# Patient Record
Sex: MALE | NOT HISPANIC OR LATINO | URBAN - METROPOLITAN AREA
[De-identification: names, ages, dates, MRNs, and addresses within clinical notes are randomized per-mention and may not be internally consistent; named-entity substitution may affect disease eponyms.]

---

## 2017-04-20 ENCOUNTER — IMPORTED ENCOUNTER (OUTPATIENT)
Dept: URBAN - METROPOLITAN AREA CLINIC 1 | Facility: CLINIC | Age: 82
End: 2017-04-20

## 2017-04-20 PROBLEM — Z96.1: Noted: 2017-04-20

## 2017-04-20 PROBLEM — H01.002: Noted: 2017-04-20

## 2017-04-20 PROBLEM — H01.005: Noted: 2017-04-20

## 2017-04-20 PROBLEM — H40.1131: Noted: 2017-04-20

## 2017-04-20 PROCEDURE — 92012 INTRM OPH EXAM EST PATIENT: CPT

## 2017-04-20 NOTE — PATIENT DISCUSSION
1.  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 Patient advised to be compliant with gtts. Condition was discussed with patient and patient understands. Will continue to monitor patient for any progression in condition. Patient was advised to call us with any problems questions or concerns. 2.  Blepharitis posterior type OU- Continue daily warm compresses and lid scrubs were recommended. 3. Pseudophakia OU (Torics)- Doing well. 4.  H/o Gr 1 HTN Rt OU5. Return for an appointment for a 30/OCT in November with Dr. Karla Painting.

## 2017-11-06 ENCOUNTER — IMPORTED ENCOUNTER (OUTPATIENT)
Dept: URBAN - METROPOLITAN AREA CLINIC 1 | Facility: CLINIC | Age: 82
End: 2017-11-06

## 2017-11-06 PROBLEM — H35.033: Noted: 2017-11-06

## 2017-11-06 PROBLEM — H40.1131: Noted: 2017-11-06

## 2017-11-06 PROBLEM — H43.813: Noted: 2017-11-06

## 2017-11-06 PROBLEM — H01.002: Noted: 2017-11-06

## 2017-11-06 PROBLEM — Z96.1: Noted: 2017-11-06

## 2017-11-06 PROBLEM — H01.005: Noted: 2017-11-06

## 2017-11-06 PROCEDURE — 92133 CPTRZD OPH DX IMG PST SGM ON: CPT

## 2017-11-06 PROCEDURE — 92014 COMPRE OPH EXAM EST PT 1/>: CPT

## 2017-11-06 NOTE — PATIENT DISCUSSION
1.  Mild Open Angle Glaucoma OU (0.65/0.7)- Stable IOP OU. OCT shows no progression OU. Patient to continue with Latanoprost OU QHS. Patient advised to be compliant with gtts. Condition was discussed with patient and patient understands. Will continue to monitor patient for any progression in condition. Patient was advised to call us with any problems questions or concerns. 2.  Blepharitis anterior type OU-Progressed OU. The increased of daily warm compresses x 5 minutes and lid scrubs were recommended BID. 3. GR I Hypertensive Retinopathy OU- Stable continue HTN Control4. PVD OU- Old stable. 5.  Pseudophakia OU (Torics OU)- Doing well 6. Patient defers the refraction at today's visit    7. Return for an appointment for a 10 in  months with Dr. Chante Iglesias.

## 2018-05-08 ENCOUNTER — IMPORTED ENCOUNTER (OUTPATIENT)
Dept: URBAN - METROPOLITAN AREA CLINIC 1 | Facility: CLINIC | Age: 83
End: 2018-05-08

## 2018-05-08 PROBLEM — H40.1131: Noted: 2018-05-08

## 2018-05-08 PROCEDURE — 99213 OFFICE O/P EST LOW 20 MIN: CPT

## 2018-05-08 NOTE — PATIENT DISCUSSION
1.  Mild Open Angle Glaucoma OU (0.65/0.7)- Stable IOP 14 OU. Patient to continue with Latanoprost OU QHS. Patient advised to be compliant with gtts. Condition was discussed with patient and patient understands. Will continue to monitor patient for any progression in condition. Patient was advised to call us with any problems questions or concerns. 2.  Blepharitis anterior type OU-Progressed OU. The increased of daily warm compresses x 5 minutes and lid scrubs were recommended BID. 3. GR I Hypertensive Retinopathy OU- Stable continue HTN Control4. PVD OU- Old stable. 5.  Pseudophakia OU (Torics OU)- Doing well 6. Return for an appointment in 6 months for 30 and OCT. with Dr. Martha Hernández.

## 2018-05-08 NOTE — PATIENT DISCUSSION
2.  Blepharitis anterior type OU-Progressed OU. The increased of daily warm compresses x 5 minutes and lid scrubs were recommended BID. 3. GR I Hypertensive Retinopathy OU- Stable continue HTN Control4. PVD OU- Old stable.  5.  Pseudophakia OU (Torics OU)- Doing well

## 2019-01-10 ENCOUNTER — IMPORTED ENCOUNTER (OUTPATIENT)
Dept: URBAN - METROPOLITAN AREA CLINIC 1 | Facility: CLINIC | Age: 84
End: 2019-01-10

## 2019-01-10 PROBLEM — H35.033: Noted: 2019-01-10

## 2019-01-10 PROBLEM — H43.812: Noted: 2019-01-10

## 2019-01-10 PROBLEM — H01.002: Noted: 2019-01-10

## 2019-01-10 PROBLEM — H40.1131: Noted: 2019-01-10

## 2019-01-10 PROBLEM — H01.005: Noted: 2019-01-10

## 2019-01-10 PROBLEM — Z96.1: Noted: 2019-01-10

## 2019-01-10 PROCEDURE — 92014 COMPRE OPH EXAM EST PT 1/>: CPT

## 2019-01-10 PROCEDURE — 92133 CPTRZD OPH DX IMG PST SGM ON: CPT

## 2019-01-10 NOTE — PATIENT DISCUSSION
1.  Mild Open Angle Glaucoma OU (0.75 OU)- Stable IOP OU. Increased C/D OU. No progresson by OCT OU. Patient to continue with Latanoprost OU QHS. Patient advised to be compliant with gtts. Condition was discussed with patient and patient understands. Will continue to monitor patient for any progression in condition. Patient was advised to call us with any problems questions or concerns. 2.  Blepharitis anterior type OU- Continue daily hot compresses and lid scrubs were recommended. 3. GR I Hypertensive Retinopathy OU- Stable continue HTN Control4. PVD w/o Tear OS- Old stable. RD precautions. 5.  Pseudophakia OU - Doing well. 6.  Return for an appointment for 10/HVF in 6 months with Dr. Eliazar Acuña.

## 2019-07-25 ENCOUNTER — IMPORTED ENCOUNTER (OUTPATIENT)
Dept: URBAN - METROPOLITAN AREA CLINIC 1 | Facility: CLINIC | Age: 84
End: 2019-07-25

## 2019-07-25 PROBLEM — H01.001: Noted: 2019-07-25

## 2019-07-25 PROBLEM — H40.1131: Noted: 2019-07-25

## 2019-07-25 PROBLEM — Z96.1: Noted: 2019-07-25

## 2019-07-25 PROBLEM — H01.004: Noted: 2019-07-25

## 2019-07-25 PROCEDURE — 99213 OFFICE O/P EST LOW 20 MIN: CPT

## 2019-07-25 PROCEDURE — 92083 EXTENDED VISUAL FIELD XM: CPT

## 2019-07-25 NOTE — PATIENT DISCUSSION
1.  Mild Open Angle Glaucoma OU (CD 0.75 OU) - HVF essentially WNL OU. IOP elevated despite compliance will cont to observe on current regimen. Cont Latanoprost QHS OU (erx). Patient advised to be compliant with gtts. Condition was discussed with patient and patient understands. Will continue to monitor patient for any progression in condition. Patient was advised to call us with any problems questions or concerns. 2.  Pseudophakia OU - (Toric OU) 3. Anterior Blepharitis OU - Daily Hot compresses and lid scrubs were recommended. 4. H/o GR I Hypertensive Retinopathy OU 5. H/o PVD w/o Tear OSReturn for an appointment in 6 months 30/OCT with Dr. Griselda Miranda.

## 2020-01-30 ENCOUNTER — IMPORTED ENCOUNTER (OUTPATIENT)
Dept: URBAN - METROPOLITAN AREA CLINIC 1 | Facility: CLINIC | Age: 85
End: 2020-01-30

## 2020-01-30 PROBLEM — Z96.1: Noted: 2020-01-30

## 2020-01-30 PROBLEM — H40.1131: Noted: 2020-01-30

## 2020-01-30 PROCEDURE — 92133 CPTRZD OPH DX IMG PST SGM ON: CPT

## 2020-01-30 PROCEDURE — 92014 COMPRE OPH EXAM EST PT 1/>: CPT

## 2020-01-30 NOTE — PATIENT DISCUSSION
1.  Mild Open Angle Glaucoma OU (CD 0.75 OU) IOP Stable on Latanoprost. OCT shows no progression OU. Cont Latanoprost QHS OU. Compliance urged. Refill sent to patient's pharmacy today. 2.  Pseudophakia OU - (Toric OU) 3. Anterior Blepharitis OU - Cont Daily Hot compresses and lid scrubs were recommended. 4. GR I Hypertensive Retinopathy OU - stable HTN control. 5.  PVD w/o Tear OS- stable RD precautions. Letter to PCPx deferredReturn for an appointment in 6 mo 10 VF 24-2 OU with Dr. Eliazar Acuña.

## 2021-05-06 ENCOUNTER — IMPORTED ENCOUNTER (OUTPATIENT)
Dept: URBAN - METROPOLITAN AREA CLINIC 1 | Facility: CLINIC | Age: 86
End: 2021-05-06

## 2021-05-06 PROBLEM — G45.9: Noted: 2021-05-06

## 2021-05-06 PROCEDURE — 99213 OFFICE O/P EST LOW 20 MIN: CPT

## 2021-05-06 NOTE — PATIENT DISCUSSION
Mild Open Angle Glaucoma OU (CD 0.75 OU) IOP Stable continue Latanoprost QHS OU.  2.  Pseudophakia OU - (Toric OU) 3. Anterior Blepharitis OU - Cont Daily Hot compresses and lid scrubs were recommended. 4. GR I Hypertensive Retinopathy OU - stable HTN control. 5.  PVD w/o Tear OU - stable RD precautions. Return for an appointment in PRN with Dr. Taina Cormier.

## 2021-05-06 NOTE — PATIENT DISCUSSION
1.  Possible TIA vs Ocular Migraine - Consider baby ASA QD. Patient to follow up with PCP for further work up 2. Mild Open Angle Glaucoma OU (CD 0.75 OU) IOP Stable continue Latanoprost QHS OU.  2.  Pseudophakia OU - (Toric OU) 3. Anterior Blepharitis OU - Cont Daily Hot compresses and lid scrubs were recommended. 4. GR I Hypertensive Retinopathy OU - stable HTN control. 5.  PVD w/o Tear OU - stable RD precautions. Return for an appointment in PRN with Dr. Leticia Hilliard.

## 2022-04-02 ASSESSMENT — VISUAL ACUITY
OS_SC: 20/20
OS_CC: 20/50
OS_SC: 20/20-2
OD_CC: 20/25
OD_SC: 20/20
OD_SC: 20/20
OD_CC: 20/60
OS_PH: SC 20/25
OS_CC: 20/40
OS_CC: 20/40
OD_CC: J1
OD_SC: 20/20
OD_SC: 20/20
OS_CC: 20/25-1
OS_CC: J1
OS_CC: 20/40
OS_SC: 20/20
OD_CC: 20/30
OD_PH: SC 20/25
OS_CC: 20/30+2
OS_SC: 20/20
OD_GLARE: 20/30
OS_SC: J3
OD_SC: J3
OS_SC: 20/20
OD_CC: 20/30
OD_CC: 20/30
OD_CC: 20/30-1
OD_SC: 20/20

## 2022-04-02 ASSESSMENT — TONOMETRY
OD_IOP_MMHG: 13
OD_IOP_MMHG: 15
OS_IOP_MMHG: 14
OD_IOP_MMHG: 13
OS_IOP_MMHG: 12
OS_IOP_MMHG: 15
OD_IOP_MMHG: 19
OS_IOP_MMHG: 13
OD_IOP_MMHG: 14
OS_IOP_MMHG: 14
OD_IOP_MMHG: 14
OD_IOP_MMHG: 12
OS_IOP_MMHG: 19
OS_IOP_MMHG: 13